# Patient Record
Sex: FEMALE | Race: WHITE | Employment: OTHER | URBAN - METROPOLITAN AREA
[De-identification: names, ages, dates, MRNs, and addresses within clinical notes are randomized per-mention and may not be internally consistent; named-entity substitution may affect disease eponyms.]

---

## 2021-02-18 ENCOUNTER — IMMUNIZATION (OUTPATIENT)
Dept: PRIMARY CARE CLINIC | Age: 71
End: 2021-02-18
Payer: MEDICARE

## 2021-02-18 PROCEDURE — 0011A COVID-19, MODERNA VACCINE 100MCG/0.5ML DOSE: CPT | Performed by: NURSE PRACTITIONER

## 2021-02-18 PROCEDURE — 91301 COVID-19, MODERNA VACCINE 100MCG/0.5ML DOSE: CPT | Performed by: NURSE PRACTITIONER

## 2021-03-18 ENCOUNTER — IMMUNIZATION (OUTPATIENT)
Dept: PRIMARY CARE CLINIC | Age: 71
End: 2021-03-18
Payer: MEDICARE

## 2021-03-18 PROCEDURE — 91301 COVID-19, MODERNA VACCINE 100MCG/0.5ML DOSE: CPT | Performed by: NURSE PRACTITIONER

## 2021-03-18 PROCEDURE — 0012A COVID-19, MODERNA VACCINE 100MCG/0.5ML DOSE: CPT | Performed by: NURSE PRACTITIONER

## 2021-12-17 ENCOUNTER — IMMUNIZATION (OUTPATIENT)
Dept: PRIMARY CARE CLINIC | Age: 71
End: 2021-12-17
Payer: MEDICARE

## 2021-12-17 PROCEDURE — 0064A COVID-19, MODERNA BOOSTER VACCINE 0.25ML DOSE: CPT | Performed by: NURSE PRACTITIONER

## 2021-12-17 PROCEDURE — 91306 COVID-19, MODERNA BOOSTER VACCINE 0.25ML DOSE: CPT | Performed by: NURSE PRACTITIONER

## 2023-12-11 ENCOUNTER — HOSPITAL ENCOUNTER (OUTPATIENT)
Age: 73
Discharge: HOME | End: 2023-12-11
Payer: MEDICARE

## 2023-12-11 ENCOUNTER — HOSPITAL ENCOUNTER (OUTPATIENT)
Dept: CT IMAGING | Age: 73
Discharge: HOME | End: 2023-12-11
Payer: MEDICARE

## 2023-12-11 ENCOUNTER — HOSPITAL ENCOUNTER (OUTPATIENT)
Dept: ULTRASOUND IMAGING | Age: 73
Discharge: HOME | End: 2023-12-11
Payer: MEDICARE

## 2023-12-11 ENCOUNTER — HOSPITAL ENCOUNTER (EMERGENCY)
Age: 73
Discharge: HOME | End: 2023-12-11
Attending: EMERGENCY MEDICINE
Payer: MEDICARE

## 2023-12-11 VITALS
BODY MASS INDEX: 52.42 KG/M2 | RESPIRATION RATE: 18 BRPM | TEMPERATURE: 98.2 F | DIASTOLIC BLOOD PRESSURE: 63 MMHG | HEART RATE: 96 BPM | SYSTOLIC BLOOD PRESSURE: 136 MMHG | WEIGHT: 293 LBS | OXYGEN SATURATION: 100 %

## 2023-12-11 DIAGNOSIS — I82.432 ACUTE DEEP VEIN THROMBOSIS (DVT) OF POPLITEAL VEIN OF LEFT LOWER EXTREMITY (HCC): Primary | ICD-10-CM

## 2023-12-11 DIAGNOSIS — M79.605 LEFT LEG PAIN: ICD-10-CM

## 2023-12-11 DIAGNOSIS — I26.99: ICD-10-CM

## 2023-12-11 DIAGNOSIS — U07.1: ICD-10-CM

## 2023-12-11 LAB
BUN SERPL-MCNC: 9 MG/DL (ref 6–23)
CREAT SERPL-MCNC: 0.8 MG/DL (ref 0.5–1)
GFR SERPL CREATININE-BSD FRML MDRD: >60 ML/MIN/1.73M2

## 2023-12-11 PROCEDURE — 96372 THER/PROPH/DIAG INJ SC/IM: CPT

## 2023-12-11 PROCEDURE — 6360000004 HC RX CONTRAST MEDICATION: Performed by: RADIOLOGY

## 2023-12-11 PROCEDURE — 6360000002 HC RX W HCPCS: Performed by: EMERGENCY MEDICINE

## 2023-12-11 PROCEDURE — 71275 CT ANGIOGRAPHY CHEST: CPT

## 2023-12-11 PROCEDURE — 84520 ASSAY OF UREA NITROGEN: CPT

## 2023-12-11 PROCEDURE — 93971 EXTREMITY STUDY: CPT

## 2023-12-11 PROCEDURE — 99284 EMERGENCY DEPT VISIT MOD MDM: CPT

## 2023-12-11 PROCEDURE — 82565 ASSAY OF CREATININE: CPT

## 2023-12-11 PROCEDURE — 36415 COLL VENOUS BLD VENIPUNCTURE: CPT

## 2023-12-11 RX ORDER — ENOXAPARIN SODIUM 150 MG/ML
1 INJECTION SUBCUTANEOUS ONCE
Status: COMPLETED | OUTPATIENT
Start: 2023-12-11 | End: 2023-12-11

## 2023-12-11 RX ADMIN — ENOXAPARIN SODIUM 150 MG: 150 INJECTION SUBCUTANEOUS at 18:44

## 2023-12-11 RX ADMIN — IOPAMIDOL 75 ML: 755 INJECTION, SOLUTION INTRAVENOUS at 14:38

## 2023-12-11 ASSESSMENT — PAIN DESCRIPTION - PAIN TYPE: TYPE: ACUTE PAIN

## 2023-12-11 ASSESSMENT — PAIN DESCRIPTION - DESCRIPTORS: DESCRIPTORS: TENDER

## 2023-12-11 ASSESSMENT — ENCOUNTER SYMPTOMS
ABDOMINAL PAIN: 0
CHEST TIGHTNESS: 0

## 2023-12-11 ASSESSMENT — PAIN DESCRIPTION - LOCATION: LOCATION: LEG

## 2023-12-11 ASSESSMENT — PAIN DESCRIPTION - ORIENTATION: ORIENTATION: LEFT

## 2023-12-11 ASSESSMENT — PAIN - FUNCTIONAL ASSESSMENT: PAIN_FUNCTIONAL_ASSESSMENT: 0-10

## 2023-12-11 NOTE — ED PROVIDER NOTES
discharge with outpatient follow-up. The plan has been discussed in detail and they are aware of the specific conditions for emergent return, as well as the importance of follow-up. Discharge Medication List as of 12/11/2023  7:00 PM        START taking these medications    Details   apixaban (ELIQUIS) 5 MG TABS tablet 10 mg BID for 1 week, then 5 mg BID, Disp-74 tablet, R-0Normal             Diagnosis:  1. Acute deep vein thrombosis (DVT) of popliteal vein of left lower extremity (HCC)        Disposition:  Patient's disposition: Discharge to home  Patient's condition is stable.                  Bj Rain,   12/12/23 0018

## 2023-12-11 NOTE — ED TRIAGE NOTES
Department of Emergency Medicine  FIRST PROVIDER TRIAGE NOTE             Independent MLP           12/11/23  6:05 PM EST    Date of Encounter: 12/11/23   MRN: [id number]      HPI: Stacey Bain is a 72 y.o. female who presents to the ED for Other (Sent from pmd for dvt lt leg/ had us and cta today )   CTA negative for PE.   US LE findings: Near occlusive thrombus of the left popliteal vein extending into the calf. These vessels appear dilated suggesting a more acute process. C/o pain in leg. No recent travel. Also c/o URI symptoms          ROS: Negative for CP, SOB  + cough    PE:constitutional: awake, alert, NAD   Cardiovascular: RRR      Initial Plan of Care: All treatment areas with department are currently occupied. Plan to order/Initiate the following while awaiting opening in ED: .   Initiate Treatment-Testing, Proceed toTreatment Area When Bed Available for ED Attending/MLP to Continue Care    Electronically signed by SEEMA Patel CNP   DD: 12/11/23

## 2024-01-31 ENCOUNTER — HOSPITAL ENCOUNTER (OUTPATIENT)
Dept: GENERAL RADIOLOGY | Age: 74
Discharge: HOME | End: 2024-02-02
Payer: MEDICARE

## 2024-01-31 DIAGNOSIS — R05.3 CHRONIC COUGH: ICD-10-CM

## 2024-01-31 PROCEDURE — 6370000000 HC RX 637 (ALT 250 FOR IP)

## 2024-01-31 PROCEDURE — 74220 X-RAY XM ESOPHAGUS 1CNTRST: CPT

## 2024-01-31 PROCEDURE — 2500000003 HC RX 250 WO HCPCS

## 2024-01-31 RX ADMIN — ANTACID/ANTIFLATULENT 1 EACH: 380; 550; 10; 10 GRANULE, EFFERVESCENT ORAL at 09:35

## 2024-01-31 RX ADMIN — BARIUM SULFATE 334 G: 980 POWDER, FOR SUSPENSION ORAL at 09:34

## 2024-03-08 ENCOUNTER — TELEPHONE (OUTPATIENT)
Dept: ENT CLINIC | Age: 74
End: 2024-03-08

## 2024-03-08 ENCOUNTER — OFFICE VISIT (OUTPATIENT)
Dept: ENT CLINIC | Age: 74
End: 2024-03-08
Payer: MEDICARE

## 2024-03-08 VITALS
DIASTOLIC BLOOD PRESSURE: 75 MMHG | SYSTOLIC BLOOD PRESSURE: 120 MMHG | WEIGHT: 293 LBS | BODY MASS INDEX: 50.02 KG/M2 | HEART RATE: 73 BPM | HEIGHT: 64 IN

## 2024-03-08 DIAGNOSIS — J31.0 CHRONIC RHINITIS: Primary | ICD-10-CM

## 2024-03-08 DIAGNOSIS — J32.4 CHRONIC PANSINUSITIS: ICD-10-CM

## 2024-03-08 DIAGNOSIS — R09.81 CHRONIC NASAL CONGESTION: ICD-10-CM

## 2024-03-08 PROCEDURE — 1123F ACP DISCUSS/DSCN MKR DOCD: CPT | Performed by: OTOLARYNGOLOGY

## 2024-03-08 PROCEDURE — 99203 OFFICE O/P NEW LOW 30 MIN: CPT | Performed by: OTOLARYNGOLOGY

## 2024-03-08 RX ORDER — FLUTICASONE PROPIONATE AND SALMETEROL 113; 14 UG/1; UG/1
1 POWDER, METERED RESPIRATORY (INHALATION) 2 TIMES DAILY
COMMUNITY

## 2024-03-08 RX ORDER — LORATADINE 10 MG/1
10 TABLET ORAL DAILY
COMMUNITY
Start: 2024-01-22

## 2024-03-08 RX ORDER — RIVAROXABAN 20 MG/1
20 TABLET, FILM COATED ORAL DAILY
COMMUNITY
Start: 2024-02-06

## 2024-03-08 RX ORDER — MONTELUKAST SODIUM 10 MG/1
10 TABLET ORAL DAILY
Qty: 30 TABLET | Refills: 3 | Status: SHIPPED | OUTPATIENT
Start: 2024-03-08

## 2024-03-08 RX ORDER — AZELASTINE 1 MG/ML
1 SPRAY, METERED NASAL DAILY
COMMUNITY

## 2024-03-08 RX ORDER — PANTOPRAZOLE SODIUM 40 MG/1
40 TABLET, DELAYED RELEASE ORAL DAILY
COMMUNITY
Start: 2024-03-07

## 2024-03-08 NOTE — PATIENT INSTRUCTIONS
Flonase 2 sprays each nostril once daily.     Stop Claritin.    Can use Asterpro as needed.     Start Singulair daily.

## 2024-03-08 NOTE — PROGRESS NOTES
[facility]  Dr. Brett Talley D.O. Ms.Ed.  New Consult       Patient Name:  Amber Nolen  :  1950     CHIEF C/O:    Chief Complaint   Patient presents with    New Patient     NP follow up CT sinus patient states that she started with sinus issues in 2023 patient states that she has PND coughing then dx COVID and no improvement patient states left ear pain patient recently dx with asthma wet cough and productive       HISTORY OBTAINED FROM:  patient    HISTORY OF PRESENT ILLNESS:       Amber is a 73 y.o. year old female, here today for:       Here for evaluation for sinus complaints starting in November, had a + COVID and has not had any improvement. Was on cefdinir, keflex and cipro oral abx. Was on otc flonase and otc asterpro and claritin but not using consistent had CT in January. Showed R>L sinus disease.            Past Medical History:   Diagnosis Date    Asthma     Atypical glandular cells on vaginal Papanicolaou smear 2016    History of endometrial cancer 2005    FIGO Grade I     Skin cancer      Past Surgical History:   Procedure Laterality Date    ANKLE FRACTURE SURGERY Right 1997    ORIF     BREAST BIOPSY   [facility]    benign     SECTION  1974    ENDOMETRIAL BIOPSY  2005  [facility]    2005 Dr Chaudhary    HYSTERECTOMY (CERVIX STATUS UNKNOWN)  2005 [facility]    HAYDEN/BSO  Dr Chaudhary     SKIN BIOPSY Left     shoulder - benign     TONSILLECTOMY AND ADENOIDECTOMY      WISDOM TOOTH EXTRACTION         Current Outpatient Medications:     XARELTO 20 MG TABS tablet, Take 1 tablet by mouth daily, Disp: , Rfl:     pantoprazole (PROTONIX) 40 MG tablet, Take 1 tablet by mouth daily, Disp: , Rfl:     loratadine (CLARITIN) 10 MG tablet, Take 1 tablet by mouth daily, Disp: , Rfl:     azelastine (ASTELIN) 0.1 % nasal spray, 1 spray by Nasal route daily Use in each nostril as directed, Disp: , Rfl:

## 2024-03-08 NOTE — TELEPHONE ENCOUNTER
Pt came to check in at the [place] office but pt  was not responding to calls and is sick at home, pt was worried and decided to leave to check on . Pt needs rescheduled for soon appointment if possible.

## 2024-04-23 ENCOUNTER — OFFICE VISIT (OUTPATIENT)
Dept: ENT CLINIC | Age: 74
End: 2024-04-23
Payer: MEDICARE

## 2024-04-23 VITALS
WEIGHT: 293 LBS | DIASTOLIC BLOOD PRESSURE: 64 MMHG | HEART RATE: 76 BPM | BODY MASS INDEX: 53.27 KG/M2 | SYSTOLIC BLOOD PRESSURE: 118 MMHG

## 2024-04-23 DIAGNOSIS — J31.0 CHRONIC RHINITIS: Primary | ICD-10-CM

## 2024-04-23 DIAGNOSIS — R05.2 SUBACUTE COUGH: ICD-10-CM

## 2024-04-23 DIAGNOSIS — R09.81 CHRONIC NASAL CONGESTION: ICD-10-CM

## 2024-04-23 DIAGNOSIS — J32.4 CHRONIC PANSINUSITIS: ICD-10-CM

## 2024-04-23 PROCEDURE — 99213 OFFICE O/P EST LOW 20 MIN: CPT | Performed by: OTOLARYNGOLOGY

## 2024-04-23 PROCEDURE — 1123F ACP DISCUSS/DSCN MKR DOCD: CPT | Performed by: OTOLARYNGOLOGY

## 2024-04-23 RX ORDER — METHYLPREDNISOLONE 4 MG/1
TABLET ORAL
Qty: 1 KIT | Refills: 0 | Status: SHIPPED | OUTPATIENT
Start: 2024-04-23 | End: 2024-04-29

## 2024-04-23 RX ORDER — FLUTICASONE PROPIONATE 50 MCG
1 SPRAY, SUSPENSION (ML) NASAL DAILY
COMMUNITY

## 2024-04-23 ASSESSMENT — ENCOUNTER SYMPTOMS
VOICE CHANGE: 0
TROUBLE SWALLOWING: 0
SINUS PRESSURE: 0
VOMITING: 0
SORE THROAT: 0
RHINORRHEA: 1
COUGH: 1
SHORTNESS OF BREATH: 0

## 2024-04-23 NOTE — PROGRESS NOTES
[facility]  MERA PeñalozaO. Ms.Ed.         Patient Name:  Amber Nolen  :  1950     CHIEF C/O:    Chief Complaint   Patient presents with    Follow-up     Pt states sinuses are not good right now. Think drainage       HISTORY OBTAINED FROM:  patient    HISTORY OF PRESENT ILLNESS:       Amber is a 73 y.o. year old female, here today for:       Here for evaluation for follow up of sinus. Patient has been on flonase and claritin daily. Was on astelin and singulair for 1 month with no change in symptoms. Still complains of cough, thick post nasal drainage.  No sinus infections since the winter. Was on inhalers that pulmonology changed without improvement of symptoms.  No hx of seasonal allergies in the past. She does have hx of gerd, on protonix 40mg daily.            Past Medical History:   Diagnosis Date    Asthma     Atypical glandular cells on vaginal Papanicolaou smear 2016    History of endometrial cancer 2005    FIGO Grade I     Skin cancer      Past Surgical History:   Procedure Laterality Date    ANKLE FRACTURE SURGERY Right 1997    ORIF     BREAST BIOPSY   [facility]    benign     SECTION  1974    ENDOMETRIAL BIOPSY  2005  [facility]    2005 Dr Chaudhary    HYSTERECTOMY (CERVIX STATUS UNKNOWN)  2005 [facility]    HAYDEN/BSO  Dr Chaudhary     SKIN BIOPSY Left     shoulder - benign     TONSILLECTOMY AND ADENOIDECTOMY      WISDOM TOOTH EXTRACTION         Current Outpatient Medications:     fluticasone (FLONASE) 50 MCG/ACT nasal spray, 1 spray by Each Nostril route daily, Disp: , Rfl:     XARELTO 20 MG TABS tablet, Take 1 tablet by mouth daily, Disp: , Rfl:     pantoprazole (PROTONIX) 40 MG tablet, Take 1 tablet by mouth daily, Disp: , Rfl:     loratadine (CLARITIN) 10 MG tablet, Take 1 tablet by mouth daily, Disp: , Rfl:     Fluticasone-Salmeterol,sensor, (AIRDUO DIGIHALER) 113-14 MCG/ACT AEPB, Inhale 1 puff

## 2024-06-07 ENCOUNTER — OFFICE VISIT (OUTPATIENT)
Dept: ENT CLINIC | Age: 74
End: 2024-06-07
Payer: MEDICARE

## 2024-06-07 VITALS
HEART RATE: 64 BPM | DIASTOLIC BLOOD PRESSURE: 76 MMHG | HEIGHT: 64 IN | BODY MASS INDEX: 50.02 KG/M2 | WEIGHT: 293 LBS | SYSTOLIC BLOOD PRESSURE: 126 MMHG

## 2024-06-07 DIAGNOSIS — J32.4 CHRONIC PANSINUSITIS: ICD-10-CM

## 2024-06-07 DIAGNOSIS — R05.3 CHRONIC COUGH: Primary | ICD-10-CM

## 2024-06-07 DIAGNOSIS — R09.81 CHRONIC NASAL CONGESTION: ICD-10-CM

## 2024-06-07 DIAGNOSIS — J30.9 ALLERGIC RHINITIS, UNSPECIFIED SEASONALITY, UNSPECIFIED TRIGGER: ICD-10-CM

## 2024-06-07 DIAGNOSIS — J31.0 CHRONIC RHINITIS: ICD-10-CM

## 2024-06-07 PROCEDURE — 1123F ACP DISCUSS/DSCN MKR DOCD: CPT | Performed by: OTOLARYNGOLOGY

## 2024-06-07 PROCEDURE — 99213 OFFICE O/P EST LOW 20 MIN: CPT | Performed by: OTOLARYNGOLOGY

## 2024-06-07 RX ORDER — FUROSEMIDE 20 MG/1
20 TABLET ORAL DAILY
COMMUNITY
Start: 2024-05-22

## 2024-06-07 RX ORDER — POTASSIUM CHLORIDE 750 MG/1
CAPSULE, EXTENDED RELEASE ORAL
COMMUNITY
Start: 2024-06-04

## 2024-06-07 NOTE — PROGRESS NOTES
[facility]  MERA PeñalozaO. Ms.Ed.         Patient Name:  Amber Nolen  :  1950     CHIEF C/O:    Chief Complaint   Patient presents with    Follow-up     6 wk f/u sinus       HISTORY OBTAINED FROM:  patient    HISTORY OF PRESENT ILLNESS:       Amber is a 73 y.o. year old female, here today for:       Here for evaluation for follow up of sinus. Patient has been on flonase and claritin daily. Was on astelin and singulair for 1 month with no change in symptoms. Still complains of cough, thick post nasal drainage.  No sinus infections since the winter. Was on inhalers that pulmonology changed without improvement of symptoms.  No hx of seasonal allergies in the past. She does have hx of gerd, on protonix 40mg daily.        Interval hx: Pt returns for recheck of sinuses. Using flonase daily. As well as singulair.. Also uses an inhaler after COVID a few years ago. Overall improvement of symptoms.     Past Medical History:   Diagnosis Date    Asthma     Atypical glandular cells on vaginal Papanicolaou smear 2016    History of endometrial cancer 2005    FIGO Grade I     Skin cancer      Past Surgical History:   Procedure Laterality Date    ANKLE FRACTURE SURGERY Right 1997    ORIF     BREAST BIOPSY   [facility]    benign     SECTION  1974    ENDOMETRIAL BIOPSY  2005  [facility]    2005 Dr Chaudhary    HYSTERECTOMY (CERVIX STATUS UNKNOWN)  2005 [facility]    HAYDEN/BSO  Dr Chaudhary     SKIN BIOPSY Left     shoulder - benign     TONSILLECTOMY AND ADENOIDECTOMY      WISDOM TOOTH EXTRACTION         Current Outpatient Medications:     furosemide (LASIX) 20 MG tablet, Take 1 tablet by mouth daily, Disp: , Rfl:     potassium chloride (MICRO-K) 10 MEQ extended release capsule, , Disp: , Rfl:     fluticasone (FLONASE) 50 MCG/ACT nasal spray, 1 spray by Each Nostril route daily, Disp: , Rfl:     XARELTO 20 MG TABS tablet, Take

## 2024-12-10 ENCOUNTER — OFFICE VISIT (OUTPATIENT)
Dept: ENT CLINIC | Age: 74
End: 2024-12-10
Payer: MEDICARE

## 2024-12-10 VITALS
HEART RATE: 74 BPM | BODY MASS INDEX: 50.02 KG/M2 | WEIGHT: 293 LBS | OXYGEN SATURATION: 96 % | SYSTOLIC BLOOD PRESSURE: 133 MMHG | DIASTOLIC BLOOD PRESSURE: 75 MMHG | RESPIRATION RATE: 18 BRPM | HEIGHT: 64 IN | TEMPERATURE: 97.6 F

## 2024-12-10 DIAGNOSIS — J32.4 CHRONIC PANSINUSITIS: Primary | ICD-10-CM

## 2024-12-10 DIAGNOSIS — R05.3 CHRONIC COUGH: ICD-10-CM

## 2024-12-10 PROCEDURE — 99213 OFFICE O/P EST LOW 20 MIN: CPT | Performed by: OTOLARYNGOLOGY

## 2024-12-10 PROCEDURE — 1123F ACP DISCUSS/DSCN MKR DOCD: CPT | Performed by: OTOLARYNGOLOGY

## 2024-12-10 PROCEDURE — 1159F MED LIST DOCD IN RCRD: CPT | Performed by: OTOLARYNGOLOGY

## 2024-12-10 RX ORDER — PREDNISONE 20 MG/1
20 TABLET ORAL DAILY
Qty: 10 TABLET | Refills: 0 | Status: SHIPPED | OUTPATIENT
Start: 2024-12-10 | End: 2024-12-15

## 2024-12-10 RX ORDER — FLUTICASONE PROPIONATE AND SALMETEROL 100; 50 UG/1; UG/1
1 POWDER RESPIRATORY (INHALATION) EVERY 12 HOURS
COMMUNITY

## 2024-12-10 RX ORDER — IPRATROPIUM BROMIDE 42 UG/1
2 SPRAY, METERED NASAL 4 TIMES DAILY
Qty: 1 EACH | Refills: 3 | Status: SHIPPED | OUTPATIENT
Start: 2024-12-10

## 2024-12-10 RX ORDER — ASPIRIN 81 MG/1
81 TABLET, CHEWABLE ORAL DAILY
COMMUNITY

## 2024-12-10 ASSESSMENT — ENCOUNTER SYMPTOMS
VOMITING: 0
SINUS PRESSURE: 0
SORE THROAT: 0
SHORTNESS OF BREATH: 0
RHINORRHEA: 1
VOICE CHANGE: 0
COUGH: 1
TROUBLE SWALLOWING: 0

## 2024-12-10 NOTE — PROGRESS NOTES
me.  I agree with the assessment, plan and orders as documented by the resident.      Patient here for follow up of medical problems.         Remainder of medical problems as per resident note.      TONE CHA DO  12/12/24            Amber Nolen  1950      I have discussed the case, including pertinent history and exam findings with the resident. I have seen and examined the patient and the key elements of the encounter have been performed by me.  I agree with the assessment, plan and orders as documented by the resident.      Patient here for follow up of medical problems.         Remainder of medical problems as per resident note.      TONE CHA DO  12/12/24

## 2024-12-11 DIAGNOSIS — J32.4 CHRONIC PANSINUSITIS: ICD-10-CM

## 2024-12-16 DIAGNOSIS — J32.4 CHRONIC PANSINUSITIS: ICD-10-CM

## 2024-12-20 LAB
SEND OUT REPORT: NORMAL
TEST NAME: NORMAL

## 2025-01-15 ENCOUNTER — OFFICE VISIT (OUTPATIENT)
Dept: ENT CLINIC | Age: 75
End: 2025-01-15

## 2025-01-15 VITALS
TEMPERATURE: 97.8 F | OXYGEN SATURATION: 98 % | SYSTOLIC BLOOD PRESSURE: 124 MMHG | DIASTOLIC BLOOD PRESSURE: 73 MMHG | HEART RATE: 84 BPM | BODY MASS INDEX: 50.02 KG/M2 | HEIGHT: 64 IN | WEIGHT: 293 LBS

## 2025-01-15 DIAGNOSIS — J32.4 CHRONIC PANSINUSITIS: Primary | ICD-10-CM

## 2025-01-15 RX ORDER — IPRATROPIUM BROMIDE 42 UG/1
2 SPRAY, METERED NASAL 4 TIMES DAILY
Qty: 1 EACH | Refills: 3 | Status: SHIPPED | OUTPATIENT
Start: 2025-01-15

## 2025-01-15 RX ORDER — FLUTICASONE PROPIONATE 50 MCG
1 SPRAY, SUSPENSION (ML) NASAL DAILY
Qty: 16 G | Refills: 3 | Status: SHIPPED | OUTPATIENT
Start: 2025-01-15

## 2025-01-15 RX ORDER — MONTELUKAST SODIUM 10 MG/1
10 TABLET ORAL DAILY
Qty: 30 TABLET | Refills: 3 | Status: SHIPPED | OUTPATIENT
Start: 2025-01-15

## 2025-01-15 NOTE — PROGRESS NOTES
and palpitations.   Gastrointestinal:  Negative for vomiting.   Skin:  Negative for rash.   Allergic/Immunologic: Negative for environmental allergies.   Neurological:  Negative for dizziness and headaches.   Hematological:  Does not bruise/bleed easily.   All other systems reviewed and are negative.      /73 (Site: Right Lower Arm, Position: Sitting, Cuff Size: Large Adult)   Pulse 84   Temp 97.8 °F (36.6 °C)   Ht 1.626 m (5' 4\")   Wt (!) 140.2 kg (309 lb)   LMP 11/16/2005 (Exact Date)   SpO2 98%   BMI 53.04 kg/m²   Physical Exam  HENT:      Head: Normocephalic and atraumatic. No contusion, masses or laceration.      Jaw: No tenderness or swelling.      Right Ear: No middle ear effusion. There is no impacted cerumen.      Left Ear: There is no impacted cerumen.      Nose:      Right Nostril: No epistaxis.      Left Nostril: No epistaxis.      Right Turbinates: Enlarged and swollen.      Left Turbinates: Enlarged and swollen.      Comments: Erythema      Mouth/Throat:      Mouth: No oral lesions.      Dentition: No gum lesions.           CT Sinus without contrast  IMPRESSION:  1. Mild mucosal thickening along the medial and inferior aspects of the right  frontal sinus and involvement of the adjacent right anterior ethmoid air  cells.  2. Maxillary sinus mucous retention cysts without air-fluid level.    IMPRESSION/PLAN:  1. Chronic pansinusitis       Doing better since previous encounter. CT Sinus reviewed today with mild frontal and right anterior ethmoid disease and maxillary sinus mucous retention cyst.  Allergy testing since previous encounter, negative. Possible component of vasomotor rhinitis. Would like to continue current medical regimen. Refill of Singular, Atrovent, and Flonase given today.               Amber Nolen  1950            Amber Nolen  1950      I have discussed the case, including pertinent history and exam findings with the resident. I have seen and examined the

## 2025-01-28 ENCOUNTER — TELEPHONE (OUTPATIENT)
Dept: ENT CLINIC | Age: 75
End: 2025-01-28

## 2025-01-28 NOTE — TELEPHONE ENCOUNTER
Patient called office stating she is scheduled with Pulmologist Thursday and he would like a copy of patient allergy test. I advised patient we will need her to stop by the office a sign a release of information. I advised patient I will have a copy of the allergy test at registration along with the form release of information that she will need to sign. Patient understood.

## 2025-04-15 ENCOUNTER — OFFICE VISIT (OUTPATIENT)
Dept: ENT CLINIC | Age: 75
End: 2025-04-15

## 2025-04-15 VITALS
HEIGHT: 64 IN | WEIGHT: 293 LBS | BODY MASS INDEX: 50.02 KG/M2 | SYSTOLIC BLOOD PRESSURE: 124 MMHG | DIASTOLIC BLOOD PRESSURE: 74 MMHG | OXYGEN SATURATION: 96 % | HEART RATE: 61 BPM | RESPIRATION RATE: 16 BRPM

## 2025-04-15 DIAGNOSIS — J32.4 CHRONIC PANSINUSITIS: Primary | ICD-10-CM

## 2025-04-15 DIAGNOSIS — R05.3 CHRONIC COUGH: ICD-10-CM

## 2025-04-15 DIAGNOSIS — J31.0 CHRONIC RHINITIS: ICD-10-CM

## 2025-04-15 DIAGNOSIS — J30.9 ALLERGIC RHINITIS, UNSPECIFIED SEASONALITY, UNSPECIFIED TRIGGER: ICD-10-CM

## 2025-04-15 RX ORDER — OMEPRAZOLE 20 MG/1
CAPSULE, DELAYED RELEASE ORAL DAILY
COMMUNITY

## 2025-04-15 RX ORDER — TRIAMCINOLONE ACETONIDE 40 MG/ML
40 INJECTION, SUSPENSION INTRA-ARTICULAR; INTRAMUSCULAR ONCE
Status: COMPLETED | OUTPATIENT
Start: 2025-04-15 | End: 2025-04-15

## 2025-04-15 RX ORDER — GUAIFENESIN 600 MG/1
600 TABLET, EXTENDED RELEASE ORAL 2 TIMES DAILY
Qty: 30 TABLET | Refills: 0 | Status: SHIPPED | OUTPATIENT
Start: 2025-04-15 | End: 2025-04-30

## 2025-04-15 RX ADMIN — TRIAMCINOLONE ACETONIDE 40 MG: 40 INJECTION, SUSPENSION INTRA-ARTICULAR; INTRAMUSCULAR at 09:02

## 2025-04-15 NOTE — PROGRESS NOTES
Kenalog injection was given. Patient instructed to remain in the office for 15 minutes afterwards and to report any adverse reactions. Patient reported no adverse reactions after waiting 15 minutes and no adverse reactions noted by staff.

## 2025-04-26 ASSESSMENT — ENCOUNTER SYMPTOMS
COUGH: 0
SHORTNESS OF BREATH: 0
VOMITING: 0

## 2025-04-26 NOTE — PROGRESS NOTES
[facility]  Dr. Brett Talley D.O. Ms.Ed.        Patient Name:  Amber Nolen  :  1950     CHIEF C/O:    Chief Complaint   Patient presents with   • Follow-up     3 mo f/u pansinusitis         HISTORY OBTAINED FROM:  patient    HISTORY OF PRESENT ILLNESS:         History of Present Illness  The patient presents for evaluation of sinus issues.    She reports persistent daily symptoms characterized by the production of thick, unpleasant mucus. She sought consultation from a new pulmonologist in [place], who suggested that her condition might be more sinus-related than pulmonary. The pulmonologist changed her inhaler to Dulera, which she has been using for 2 months. Despite this, she continues to experience significant wheezing. The pulmonologist also noted wheezing upon examination, further supporting the sinus-related hypothesis. She was referred to an ENT specialist in [place], who prescribed a 2-week course of antibiotics and prednisone, but these medications did not alleviate her symptoms. She is currently using Flonase nasal spray, administering 2 sprays in each nostril daily, and ipratropium, which she uses once in the morning and once in the evening. She was previously on Singulair, but this was discontinued by her pulmonologist and replaced with loratadine. She does not believe that either medication has been beneficial. She was previously on blood thinners due to a blood clot but was switched to aspirin when she contracted COVID-19. She discontinued aspirin for a week but did not observe any change in her mucus production. She is not diabetic. She takes extra strength 8-hour Tylenol for arthritis, which seems to help her more than anything.    ALLERGIES  The patient has no known allergies.    MEDICATIONS  Current: Flonase, ipratropium, loratadine, Dulera, extra strength 8-hour Tylenol  Discontinued: Singulair           Past Medical History:   Diagnosis Date   • Asthma    •

## 2025-06-11 ENCOUNTER — TELEPHONE (OUTPATIENT)
Dept: ADMINISTRATIVE | Age: 75
End: 2025-06-11

## 2025-07-23 ENCOUNTER — TELEPHONE (OUTPATIENT)
Dept: ADMINISTRATIVE | Age: 75
End: 2025-07-23

## 2025-07-23 NOTE — TELEPHONE ENCOUNTER
Established patient noticed hearing loss for over a week and started with right ear pain this morning.  Takes Zyrtec daily and started a generic antihistamine 4 days ago.    Has an appointment Aug 5 but would like to be seen sooner. Or advice.

## 2025-07-29 ENCOUNTER — TELEPHONE (OUTPATIENT)
Dept: AUDIOLOGY | Age: 75
End: 2025-07-29

## 2025-07-29 NOTE — TELEPHONE ENCOUNTER
Call patient to speak patient regarding ear pain and decrease in hearing sensitivity.  Patient stated symptoms have resolved, at this time.  Explained to patient that we would see her on 8/5/25, in conjunction with ENT visit to assess middle ear function and complete hearing test, if needed.    Jad Enamorado CCC/A  Audiologist  [id number]  NPI#:  [id number]

## 2025-08-05 ENCOUNTER — PROCEDURE VISIT (OUTPATIENT)
Dept: AUDIOLOGY | Age: 75
End: 2025-08-05
Payer: MEDICARE

## 2025-08-05 ENCOUNTER — OFFICE VISIT (OUTPATIENT)
Dept: ENT CLINIC | Age: 75
End: 2025-08-05
Payer: MEDICARE

## 2025-08-05 VITALS
BODY MASS INDEX: 52.87 KG/M2 | HEART RATE: 60 BPM | WEIGHT: 293 LBS | OXYGEN SATURATION: 98 % | SYSTOLIC BLOOD PRESSURE: 120 MMHG | DIASTOLIC BLOOD PRESSURE: 74 MMHG

## 2025-08-05 DIAGNOSIS — J31.0 CHRONIC RHINITIS: ICD-10-CM

## 2025-08-05 DIAGNOSIS — H93.8X3 PRESSURE SENSATION IN BOTH EARS: ICD-10-CM

## 2025-08-05 DIAGNOSIS — H92.03 OTALGIA OF BOTH EARS: ICD-10-CM

## 2025-08-05 DIAGNOSIS — J30.9 ALLERGIC RHINITIS, UNSPECIFIED SEASONALITY, UNSPECIFIED TRIGGER: ICD-10-CM

## 2025-08-05 DIAGNOSIS — H93.8X3 SENSATION OF FULLNESS IN BOTH EARS: Primary | ICD-10-CM

## 2025-08-05 DIAGNOSIS — J32.4 CHRONIC PANSINUSITIS: Primary | ICD-10-CM

## 2025-08-05 DIAGNOSIS — H90.A12 CONDUCTIVE HEARING LOSS OF LEFT EAR WITH RESTRICTED HEARING OF RIGHT EAR: ICD-10-CM

## 2025-08-05 PROCEDURE — 1159F MED LIST DOCD IN RCRD: CPT | Performed by: OTOLARYNGOLOGY

## 2025-08-05 PROCEDURE — 1123F ACP DISCUSS/DSCN MKR DOCD: CPT | Performed by: OTOLARYNGOLOGY

## 2025-08-05 PROCEDURE — 99214 OFFICE O/P EST MOD 30 MIN: CPT | Performed by: OTOLARYNGOLOGY

## 2025-08-05 PROCEDURE — 92557 COMPREHENSIVE HEARING TEST: CPT | Performed by: AUDIOLOGIST

## 2025-08-05 PROCEDURE — 92567 TYMPANOMETRY: CPT | Performed by: AUDIOLOGIST

## 2025-08-05 RX ORDER — MONTELUKAST SODIUM 10 MG/1
10 TABLET ORAL DAILY
Qty: 30 TABLET | Refills: 3 | Status: SHIPPED | OUTPATIENT
Start: 2025-08-05

## 2025-08-05 RX ORDER — IPRATROPIUM BROMIDE 42 UG/1
2 SPRAY, METERED NASAL 4 TIMES DAILY
Qty: 15 ML | Refills: 3 | Status: SHIPPED | OUTPATIENT
Start: 2025-08-05

## 2025-08-05 RX ORDER — PANTOPRAZOLE SODIUM 40 MG/1
40 TABLET, DELAYED RELEASE ORAL DAILY
COMMUNITY
Start: 2025-06-04

## 2025-08-05 RX ORDER — FLUTICASONE PROPIONATE 50 MCG
2 SPRAY, SUSPENSION (ML) NASAL DAILY
Qty: 1 EACH | Refills: 3 | Status: SHIPPED | OUTPATIENT
Start: 2025-08-05

## 2025-08-05 ASSESSMENT — ENCOUNTER SYMPTOMS
TROUBLE SWALLOWING: 0
SINUS PRESSURE: 1
VOICE CHANGE: 1
RHINORRHEA: 1

## 2025-08-06 ASSESSMENT — ENCOUNTER SYMPTOMS
SHORTNESS OF BREATH: 0
VOMITING: 0
COUGH: 0